# Patient Record
Sex: MALE | Race: WHITE | Employment: OTHER | URBAN - METROPOLITAN AREA
[De-identification: names, ages, dates, MRNs, and addresses within clinical notes are randomized per-mention and may not be internally consistent; named-entity substitution may affect disease eponyms.]

---

## 2017-11-07 ENCOUNTER — FLU SHOT (OUTPATIENT)
Dept: INTERNAL MEDICINE | Facility: CLINIC | Age: 47
End: 2017-11-07

## 2017-11-07 PROCEDURE — 90471 IMMUNIZATION ADMIN: CPT | Performed by: INTERNAL MEDICINE

## 2017-11-07 PROCEDURE — 90686 IIV4 VACC NO PRSV 0.5 ML IM: CPT | Performed by: INTERNAL MEDICINE

## 2018-09-21 ENCOUNTER — TELEPHONE (OUTPATIENT)
Dept: INTERNAL MEDICINE | Facility: CLINIC | Age: 48
End: 2018-09-21

## 2018-09-21 NOTE — TELEPHONE ENCOUNTER
----- Message from Alma Bonilla sent at 9/20/2018  4:07 PM EDT -----  Patient scheduled appt to see Dr. Teixeira on Tues 9/25 but would like to speak to nurse about something before then.  He can be reached at [phone].

## 2018-09-21 NOTE — TELEPHONE ENCOUNTER
Patient stated that he has had some recent labs drawn at his Endocrinologist and wanted to know if we could get those and see them. It is Johnathon Alexander. He also stated that some of his medications were changed and I advised him to bring an updated list when comes to his appointment so that we could reconcile them. He also wanted to inform Dr. Teixeira that he had joined weight watchers again because his cholesterol levels were high. Patient spoke with Dr. Teixeira directly and relayed this information to him.

## 2018-09-25 ENCOUNTER — OFFICE VISIT (OUTPATIENT)
Dept: INTERNAL MEDICINE | Facility: CLINIC | Age: 48
End: 2018-09-25

## 2018-09-25 VITALS
TEMPERATURE: 98.3 F | OXYGEN SATURATION: 100 % | DIASTOLIC BLOOD PRESSURE: 62 MMHG | SYSTOLIC BLOOD PRESSURE: 100 MMHG | RESPIRATION RATE: 16 BRPM | HEART RATE: 80 BPM | WEIGHT: 186 LBS | BODY MASS INDEX: 24.65 KG/M2 | HEIGHT: 73 IN

## 2018-09-25 DIAGNOSIS — Z00.00 HEALTH MAINTENANCE EXAMINATION: ICD-10-CM

## 2018-09-25 DIAGNOSIS — E03.9 ACQUIRED HYPOTHYROIDISM: ICD-10-CM

## 2018-09-25 DIAGNOSIS — E11.9 TYPE 2 DIABETES MELLITUS WITHOUT COMPLICATION, WITHOUT LONG-TERM CURRENT USE OF INSULIN (HCC): ICD-10-CM

## 2018-09-25 DIAGNOSIS — E78.2 MIXED HYPERLIPIDEMIA: Primary | ICD-10-CM

## 2018-09-25 LAB — PSA SERPL-MCNC: 1.38 NG/ML (ref 0–4)

## 2018-09-25 PROCEDURE — 99396 PREV VISIT EST AGE 40-64: CPT | Performed by: INTERNAL MEDICINE

## 2018-09-25 PROCEDURE — 36415 COLL VENOUS BLD VENIPUNCTURE: CPT | Performed by: INTERNAL MEDICINE

## 2018-09-25 RX ORDER — PIOGLITAZONEHYDROCHLORIDE 30 MG/1
30 TABLET ORAL DAILY
COMMUNITY

## 2018-09-25 RX ORDER — LEVOTHYROXINE SODIUM 0.15 MG/1
150 TABLET ORAL DAILY
Qty: 30 TABLET | Refills: 5
Start: 2018-09-25

## 2018-09-25 RX ORDER — LEVOTHYROXINE SODIUM 0.05 MG/1
50 TABLET ORAL DAILY
COMMUNITY
End: 2018-09-25 | Stop reason: SDUPTHER

## 2018-09-25 NOTE — PROGRESS NOTES
Subjective   Ean Ford is a 48 y.o. male.     History of Present Illness   For annual physical and follow up of  NIDDM being followed by endocrinology.  His last A1C = 8.5.  He is now on weight watchers and trying to lose weight.  Hypothyroid on replacement no sx.  Has some rash and itchiness on back and arms but they are temporarily in an musty house while there house is being renovated.    The following portions of the patient's history were reviewed and updated as appropriate: allergies, current medications, past family history, past medical history, past social history, past surgical history and problem list.    Review of Systems   Constitutional: Negative.  Negative for activity change, appetite change, fatigue and fever.   HENT: Negative.  Negative for dental problem, ear pain, hearing loss, postnasal drip, rhinorrhea, sinus pressure, sore throat, trouble swallowing and voice change.    Eyes: Negative.  Negative for redness and visual disturbance.   Respiratory: Negative.  Negative for cough, chest tightness, shortness of breath and wheezing.    Cardiovascular: Negative.  Negative for chest pain, palpitations and leg swelling.   Gastrointestinal: Negative.  Negative for abdominal distention, abdominal pain, blood in stool, constipation, diarrhea and nausea.   Endocrine: Negative.  Negative for polydipsia and polyuria.   Genitourinary: Negative.  Negative for decreased urine volume, dysuria, hematuria and urgency.   Musculoskeletal: Negative.  Negative for arthralgias, back pain and neck pain.   Skin: Positive for rash. Negative for pallor.   Allergic/Immunologic: Negative.    Neurological: Negative.  Negative for dizziness, tremors, speech difficulty, weakness, numbness and confusion.   Hematological: Negative.  Negative for adenopathy.   Psychiatric/Behavioral: Negative.  Negative for agitation, behavioral problems, dysphoric mood, sleep disturbance, negative for hyperactivity and depressed mood. The  patient is not nervous/anxious.        Objective   Physical Exam   Constitutional: He is oriented to person, place, and time. He appears well-developed and well-nourished.   HENT:   Head: Normocephalic and atraumatic.   Right Ear: External ear normal.   Left Ear: External ear normal.   Nose: Nose normal.   Mouth/Throat: Oropharynx is clear and moist.   Eyes: Pupils are equal, round, and reactive to light. Conjunctivae and EOM are normal.   Fundoscopic exam:       The right eye shows no AV nicking and no hemorrhage.        The left eye shows no AV nicking and no hemorrhage.   Neck: Normal range of motion. Neck supple. No thyromegaly present.   Cardiovascular: Normal rate, regular rhythm, normal heart sounds and intact distal pulses.  Exam reveals no gallop and no friction rub.    No murmur heard.  Carotids normal.   Pulmonary/Chest: Effort normal and breath sounds normal. No respiratory distress. He has no wheezes. He has no rales. He exhibits no tenderness.   Abdominal: Soft. Bowel sounds are normal. He exhibits no distension and no mass. There is no tenderness. No hernia.   Genitourinary: Rectum normal, prostate normal, testes normal and penis normal.   Musculoskeletal: Normal range of motion. He exhibits no edema.   Lymphadenopathy:     He has no cervical adenopathy.   Neurological: He is alert and oriented to person, place, and time. He has normal reflexes. No cranial nerve deficit.   Skin: Skin is warm and dry.   Some areas of dryness and mild erythema on upper back and upper arms.   Psychiatric: He has a normal mood and affect. His behavior is normal. Judgment and thought content normal.   Nursing note and vitals reviewed.        Assessment/Plan   Ean was seen today for annual exam.    Diagnoses and all orders for this visit:    Mixed hyperlipidemia    Type 2 diabetes mellitus without complication, without long-term current use of insulin (CMS/[facility])    Acquired hypothyroidism    Health maintenance  examination  -     PSA Screen    Other orders  -     levothyroxine (SYNTHROID, LEVOTHROID) 150 MCG tablet; Take 1 tablet by mouth Daily.    Recommended he try moisturizing lotion on skin rash.  Rest of problems are stable and followed by endo.  He should consider a statin.  Counseled on diet and immunizations.  Health maintenance is up to date.  Same meds.  Recheck yearly.

## 2018-10-31 ENCOUNTER — FLU SHOT (OUTPATIENT)
Dept: INTERNAL MEDICINE | Facility: CLINIC | Age: 48
End: 2018-10-31

## 2018-10-31 DIAGNOSIS — Z23 NEED FOR INFLUENZA VACCINATION: ICD-10-CM

## 2018-10-31 PROCEDURE — 90471 IMMUNIZATION ADMIN: CPT | Performed by: INTERNAL MEDICINE

## 2018-10-31 PROCEDURE — 90686 IIV4 VACC NO PRSV 0.5 ML IM: CPT | Performed by: INTERNAL MEDICINE

## 2019-10-07 ENCOUNTER — OFFICE VISIT (OUTPATIENT)
Dept: INTERNAL MEDICINE | Facility: CLINIC | Age: 49
End: 2019-10-07

## 2019-10-07 VITALS
HEART RATE: 80 BPM | SYSTOLIC BLOOD PRESSURE: 128 MMHG | DIASTOLIC BLOOD PRESSURE: 78 MMHG | RESPIRATION RATE: 20 BRPM | WEIGHT: 190 LBS | BODY MASS INDEX: 25.07 KG/M2 | TEMPERATURE: 98.8 F

## 2019-10-07 DIAGNOSIS — J40 BRONCHITIS: Primary | ICD-10-CM

## 2019-10-07 PROCEDURE — 99214 OFFICE O/P EST MOD 30 MIN: CPT | Performed by: INTERNAL MEDICINE

## 2019-10-07 RX ORDER — DULAGLUTIDE 0.75 MG/.5ML
INJECTION, SOLUTION SUBCUTANEOUS
COMMUNITY
Start: 2019-09-25 | End: 2020-01-22

## 2019-10-07 RX ORDER — ROSUVASTATIN CALCIUM 10 MG/1
TABLET, COATED ORAL
COMMUNITY
Start: 2019-09-13

## 2019-10-07 RX ORDER — AZITHROMYCIN 250 MG/1
TABLET, FILM COATED ORAL
Qty: 6 TABLET | Refills: 0 | Status: SHIPPED | OUTPATIENT
Start: 2019-10-07 | End: 2020-01-22

## 2019-10-07 NOTE — PROGRESS NOTES
Subjective   Ean Ford is a 49 y.o. male.     History of Present Illness   Has had a several day history of sore throat, fever cough and congestion.  He had been on vacation in [place] and this developed near the end of the trip.  He is not coughing up much.    The following portions of the patient's history were reviewed and updated as appropriate: allergies, current medications, past medical history, past social history and problem list.    Review of Systems   Constitutional: Positive for fever. Negative for fatigue.   HENT: Positive for congestion and sore throat. Negative for ear pain.    Eyes: Negative.    Respiratory: Positive for cough. Negative for chest tightness, shortness of breath and wheezing.    Cardiovascular: Negative.  Negative for chest pain, palpitations and leg swelling.   Gastrointestinal: Negative.  Negative for diarrhea, nausea and vomiting.       Objective   Physical Exam   Constitutional: He appears well-developed and well-nourished.   HENT:   Mouth/Throat: Oropharynx is clear and moist.   Neck: Normal range of motion. Neck supple.   Cardiovascular: Normal rate and regular rhythm.   Pulmonary/Chest: Effort normal and breath sounds normal. No respiratory distress. He has no wheezes. He has no rales. He exhibits no tenderness.   Few rhonchi bilaterally.   Lymphadenopathy:     He has no cervical adenopathy.   Nursing note and vitals reviewed.        Assessment/Plan   Ean was seen today for cough and nasal congestion.    Diagnoses and all orders for this visit:    Bronchitis  -     azithromycin (ZITHROMAX Z-HANNAH) 250 MG tablet; Take 2 tablets the first day, then 1 tablet daily for 4 days.    As above.  He will take some mucinex otc.  He will call if not better or worse.

## 2020-01-22 ENCOUNTER — OFFICE VISIT (OUTPATIENT)
Dept: INTERNAL MEDICINE | Facility: CLINIC | Age: 50
End: 2020-01-22

## 2020-01-22 VITALS
WEIGHT: 197.4 LBS | SYSTOLIC BLOOD PRESSURE: 130 MMHG | OXYGEN SATURATION: 97 % | DIASTOLIC BLOOD PRESSURE: 88 MMHG | HEIGHT: 73 IN | BODY MASS INDEX: 26.16 KG/M2 | RESPIRATION RATE: 20 BRPM | TEMPERATURE: 97.4 F | HEART RATE: 84 BPM

## 2020-01-22 DIAGNOSIS — R20.0 BACK PAIN ASSOCIATED WITH PERIPHERAL NUMBNESS: Primary | ICD-10-CM

## 2020-01-22 DIAGNOSIS — M54.9 BACK PAIN ASSOCIATED WITH PERIPHERAL NUMBNESS: Primary | ICD-10-CM

## 2020-01-22 PROCEDURE — 99214 OFFICE O/P EST MOD 30 MIN: CPT | Performed by: INTERNAL MEDICINE

## 2020-01-22 NOTE — PROGRESS NOTES
Subjective   Ean Ford is a 49 y.o. male.     History of Present Illness   About one month ago he noticed some lower back discomfort that was associated with pain and some tingling and bee sting like sensation.  Since then this has increased and has risen up hip back to his scapulas and sometimes in his upper arms and legs.  Does not seem to bother him much in the daytime but is worse at night.  He will take an advil and will help some.  No weakness in extremities and no neck pain on movement.      The following portions of the patient's history were reviewed and updated as appropriate: allergies, current medications, past medical history, past social history and problem list.    Review of Systems   Constitutional: Negative.  Negative for fatigue and fever.   Eyes: Negative.    Respiratory: Negative.  Negative for cough, chest tightness, shortness of breath and wheezing.    Cardiovascular: Negative.  Negative for chest pain, palpitations and leg swelling.   Gastrointestinal: Negative.  Negative for abdominal distention, abdominal pain, constipation and diarrhea.   Genitourinary: Negative.    Musculoskeletal: Positive for back pain.   Neurological: Positive for numbness.        As noted.       Objective   Physical Exam   Constitutional: He appears well-developed and well-nourished.   Cardiovascular: Normal rate, regular rhythm and normal heart sounds.   Musculoskeletal: Normal range of motion. He exhibits no edema.   Neurological: He is alert. He displays normal reflexes. No cranial nerve deficit or sensory deficit. He exhibits normal muscle tone. Coordination normal.   Nursing note and vitals reviewed.        Assessment/Plan   Ean was seen today for back pain.    Diagnoses and all orders for this visit:    Back pain associated with peripheral numbness  -     Ambulatory Referral to Neurology  -     MRI cervical spine wo contrast; Future    I am not sure but am concerned could be some cervical spine issues.   This is the only thing I can think of that would affect all these areas.  Discussed with him.  30 minutes spend in face to face discussion and evaluation and discussion of test.